# Patient Record
Sex: FEMALE | Race: WHITE | NOT HISPANIC OR LATINO | URBAN - METROPOLITAN AREA
[De-identification: names, ages, dates, MRNs, and addresses within clinical notes are randomized per-mention and may not be internally consistent; named-entity substitution may affect disease eponyms.]

---

## 2017-05-28 ENCOUNTER — EMERGENCY (EMERGENCY)
Facility: HOSPITAL | Age: 82
LOS: 1 days | Discharge: HOME | End: 2017-05-28
Attending: EMERGENCY MEDICINE | Admitting: EMERGENCY MEDICINE
Payer: MEDICARE

## 2017-05-28 VITALS
SYSTOLIC BLOOD PRESSURE: 182 MMHG | OXYGEN SATURATION: 99 % | DIASTOLIC BLOOD PRESSURE: 79 MMHG | RESPIRATION RATE: 16 BRPM | HEART RATE: 106 BPM

## 2017-05-28 VITALS
SYSTOLIC BLOOD PRESSURE: 188 MMHG | DIASTOLIC BLOOD PRESSURE: 76 MMHG | TEMPERATURE: 98 F | RESPIRATION RATE: 16 BRPM | OXYGEN SATURATION: 100 % | HEART RATE: 110 BPM

## 2017-05-28 DIAGNOSIS — S09.90XA UNSPECIFIED INJURY OF HEAD, INITIAL ENCOUNTER: ICD-10-CM

## 2017-05-28 DIAGNOSIS — Z23 ENCOUNTER FOR IMMUNIZATION: ICD-10-CM

## 2017-05-28 DIAGNOSIS — Z79.899 OTHER LONG TERM (CURRENT) DRUG THERAPY: ICD-10-CM

## 2017-05-28 DIAGNOSIS — Z79.82 LONG TERM (CURRENT) USE OF ASPIRIN: ICD-10-CM

## 2017-05-28 DIAGNOSIS — S80.01XA CONTUSION OF RIGHT KNEE, INITIAL ENCOUNTER: ICD-10-CM

## 2017-05-28 DIAGNOSIS — Z88.1 ALLERGY STATUS TO OTHER ANTIBIOTIC AGENTS STATUS: ICD-10-CM

## 2017-05-28 DIAGNOSIS — S01.81XA LACERATION WITHOUT FOREIGN BODY OF OTHER PART OF HEAD, INITIAL ENCOUNTER: ICD-10-CM

## 2017-05-28 DIAGNOSIS — Z88.8 ALLERGY STATUS TO OTHER DRUGS, MEDICAMENTS AND BIOLOGICAL SUBSTANCES STATUS: ICD-10-CM

## 2017-05-28 DIAGNOSIS — I10 ESSENTIAL (PRIMARY) HYPERTENSION: ICD-10-CM

## 2017-05-28 PROCEDURE — 99284 EMERGENCY DEPT VISIT MOD MDM: CPT

## 2017-05-28 PROCEDURE — 73564 X-RAY EXAM KNEE 4 OR MORE: CPT | Mod: 26,RT

## 2017-05-28 PROCEDURE — 72125 CT NECK SPINE W/O DYE: CPT | Mod: 26

## 2017-05-28 PROCEDURE — 70450 CT HEAD/BRAIN W/O DYE: CPT | Mod: 26

## 2017-05-28 RX ORDER — TETANUS TOXOID, REDUCED DIPHTHERIA TOXOID AND ACELLULAR PERTUSSIS VACCINE, ADSORBED 5; 2.5; 8; 8; 2.5 [IU]/.5ML; [IU]/.5ML; UG/.5ML; UG/.5ML; UG/.5ML
0.5 SUSPENSION INTRAMUSCULAR ONCE
Qty: 0 | Refills: 0 | Status: COMPLETED | OUTPATIENT
Start: 2017-05-28 | End: 2017-05-28

## 2017-05-28 RX ORDER — AMLODIPINE BESYLATE 2.5 MG/1
0 TABLET ORAL
Qty: 0 | Refills: 0 | COMMUNITY

## 2017-05-28 RX ORDER — HYDROCHLOROTHIAZIDE 25 MG
0 TABLET ORAL
Qty: 0 | Refills: 0 | COMMUNITY

## 2017-05-28 RX ORDER — ASPIRIN/CALCIUM CARB/MAGNESIUM 324 MG
0 TABLET ORAL
Qty: 0 | Refills: 0 | COMMUNITY

## 2017-05-28 RX ORDER — ACETAMINOPHEN 500 MG
650 TABLET ORAL ONCE
Qty: 0 | Refills: 0 | Status: COMPLETED | OUTPATIENT
Start: 2017-05-28 | End: 2017-05-28

## 2017-05-28 RX ADMIN — TETANUS TOXOID, REDUCED DIPHTHERIA TOXOID AND ACELLULAR PERTUSSIS VACCINE, ADSORBED 0.5 MILLILITER(S): 5; 2.5; 8; 8; 2.5 SUSPENSION INTRAMUSCULAR at 16:32

## 2017-05-28 RX ADMIN — Medication 650 MILLIGRAM(S): at 16:32

## 2017-05-28 NOTE — ED PROVIDER NOTE - PROGRESS NOTE DETAILS
Pt's hear rate 104.  Pt on pulse ox and seen with heart rate of 90's multiple times when in room with visitor.  She states she is anxious in the emergency department and adamantly requesting discharge. Pt's hear rate 104.  Pt on pulse ox and seen with heart rate of 90's, nsr multiple times when in room with visitor.  She states she is anxious in the emergency department and adamantly requesting discharge.

## 2017-05-28 NOTE — ED PROVIDER NOTE - CARE PLAN
Principal Discharge DX:	Facial laceration, initial encounter  Secondary Diagnosis:	Knee contusion  Secondary Diagnosis:	Closed head injury

## 2017-05-28 NOTE — ED ADULT TRIAGE NOTE - CHIEF COMPLAINT QUOTE
s/p trip and fall on stairs with forehead laceration s/p trip and fall on stairs with forehead laceration and right knee hematoma

## 2017-05-28 NOTE — ED PROVIDER NOTE - MEDICAL DECISION MAKING DETAILS
86 y/o F presents to ED s/p mechanical fall.  + forehead laceration repaired by plastics.  CT head, cervical spine negative for acute injury.  R knee wet read negative for acute fracture.  Pt to f/u with plastics.  Return precautions discussed. 86 y/o F presents to ED s/p mechanical fall.  + forehead laceration repaired by plastics.  CT head, cervical spine negative for acute injury.  R knee wet read negative for acute fracture.  Pt to f/u with plastics on Friday 6/2/17.  Return precautions discussed.

## 2017-05-28 NOTE — ED PROVIDER NOTE - OBJECTIVE STATEMENT
84 y/o F presents to ED c/o trip and fall while climbing stairs.  She fell forward and struck her forehead on a step.  No LOC.  No anticoagulants.  She c/o pain to forehead laceration and R neck pain.  She also notes she has swelling to R knee but denies any pain to the leg.  Denies headache, visual changes, n/v, abd pain, chest pain. 86 y/o F presents to ED c/o trip and fall while climbing stairs.  She fell forward and struck her forehead on a step.  No LOC.  No anticoagulants.  She c/o pain to forehead laceration and R neck pain.  She also notes she has swelling to R knee but denies any pain to the leg.  Denies headache, visual changes, n/v, abd pain, chest pain, hip pain.

## 2017-07-01 ENCOUNTER — OUTPATIENT (OUTPATIENT)
Dept: OUTPATIENT SERVICES | Facility: HOSPITAL | Age: 82
LOS: 1 days | Discharge: HOME | End: 2017-07-01

## 2017-07-01 DIAGNOSIS — R10.819 ABDOMINAL TENDERNESS, UNSPECIFIED SITE: ICD-10-CM

## 2018-01-11 ENCOUNTER — APPOINTMENT (OUTPATIENT)
Dept: HEART AND VASCULAR | Facility: CLINIC | Age: 83
End: 2018-01-11
Payer: MEDICARE

## 2018-01-11 ENCOUNTER — OUTPATIENT (OUTPATIENT)
Dept: OUTPATIENT SERVICES | Facility: HOSPITAL | Age: 83
LOS: 1 days | End: 2018-01-11
Payer: MEDICARE

## 2018-01-11 DIAGNOSIS — I10 ESSENTIAL (PRIMARY) HYPERTENSION: ICD-10-CM

## 2018-01-11 DIAGNOSIS — I49.1 ATRIAL PREMATURE DEPOLARIZATION: ICD-10-CM

## 2018-01-11 DIAGNOSIS — R07.9 CHEST PAIN, UNSPECIFIED: ICD-10-CM

## 2018-01-11 PROCEDURE — 93000 ELECTROCARDIOGRAM COMPLETE: CPT

## 2018-01-11 PROCEDURE — 93306 TTE W/DOPPLER COMPLETE: CPT | Mod: 26

## 2018-01-11 PROCEDURE — 99214 OFFICE O/P EST MOD 30 MIN: CPT | Mod: 25

## 2018-01-11 PROCEDURE — 93227 XTRNL ECG REC<48 HR R&I: CPT

## 2018-01-12 PROCEDURE — 93225 XTRNL ECG REC<48 HRS REC: CPT

## 2018-01-12 PROCEDURE — 93306 TTE W/DOPPLER COMPLETE: CPT

## 2018-01-26 PROBLEM — I10 BENIGN ESSENTIAL HYPERTENSION: Status: RESOLVED | Noted: 2018-01-26 | Resolved: 2018-01-26

## 2019-02-20 ENCOUNTER — TRANSCRIPTION ENCOUNTER (OUTPATIENT)
Age: 84
End: 2019-02-20

## 2019-02-25 ENCOUNTER — TRANSCRIPTION ENCOUNTER (OUTPATIENT)
Age: 84
End: 2019-02-25

## 2019-02-28 ENCOUNTER — OUTPATIENT (OUTPATIENT)
Dept: OUTPATIENT SERVICES | Facility: HOSPITAL | Age: 84
LOS: 1 days | Discharge: HOME | End: 2019-02-28

## 2019-02-28 DIAGNOSIS — R07.82 INTERCOSTAL PAIN: ICD-10-CM

## 2019-02-28 DIAGNOSIS — J18.9 PNEUMONIA, UNSPECIFIED ORGANISM: ICD-10-CM

## 2019-02-28 DIAGNOSIS — R05 COUGH: ICD-10-CM

## 2019-02-28 PROBLEM — I10 ESSENTIAL (PRIMARY) HYPERTENSION: Chronic | Status: ACTIVE | Noted: 2017-05-28

## 2019-06-21 ENCOUNTER — TRANSCRIPTION ENCOUNTER (OUTPATIENT)
Age: 84
End: 2019-06-21

## 2019-12-13 ENCOUNTER — OUTPATIENT (OUTPATIENT)
Dept: OUTPATIENT SERVICES | Facility: HOSPITAL | Age: 84
LOS: 1 days | Discharge: HOME | End: 2019-12-13

## 2019-12-13 VITALS — HEART RATE: 73 BPM | RESPIRATION RATE: 17 BRPM | DIASTOLIC BLOOD PRESSURE: 48 MMHG | SYSTOLIC BLOOD PRESSURE: 152 MMHG

## 2019-12-13 VITALS
WEIGHT: 136.03 LBS | DIASTOLIC BLOOD PRESSURE: 90 MMHG | HEIGHT: 60 IN | SYSTOLIC BLOOD PRESSURE: 195 MMHG | RESPIRATION RATE: 17 BRPM | OXYGEN SATURATION: 96 % | TEMPERATURE: 97 F | HEART RATE: 90 BPM

## 2019-12-13 DIAGNOSIS — Z96.1 PRESENCE OF INTRAOCULAR LENS: Chronic | ICD-10-CM

## 2019-12-13 DIAGNOSIS — Z98.890 OTHER SPECIFIED POSTPROCEDURAL STATES: Chronic | ICD-10-CM

## 2019-12-13 DIAGNOSIS — Z96.611 PRESENCE OF RIGHT ARTIFICIAL SHOULDER JOINT: Chronic | ICD-10-CM

## 2019-12-13 NOTE — ASU PATIENT PROFILE, ADULT - PSH
H/O colonoscopy    H/O total shoulder replacement, right    History of intraocular lens implant  right  Status post Mohs surgery  nose

## 2019-12-18 DIAGNOSIS — H25.89 OTHER AGE-RELATED CATARACT: ICD-10-CM

## 2019-12-18 DIAGNOSIS — I10 ESSENTIAL (PRIMARY) HYPERTENSION: ICD-10-CM

## 2019-12-18 DIAGNOSIS — Z96.1 PRESENCE OF INTRAOCULAR LENS: ICD-10-CM

## 2019-12-18 DIAGNOSIS — Z96.611 PRESENCE OF RIGHT ARTIFICIAL SHOULDER JOINT: ICD-10-CM

## 2019-12-18 DIAGNOSIS — I49.9 CARDIAC ARRHYTHMIA, UNSPECIFIED: ICD-10-CM

## 2019-12-18 DIAGNOSIS — Z88.0 ALLERGY STATUS TO PENICILLIN: ICD-10-CM

## 2019-12-18 DIAGNOSIS — Z88.2 ALLERGY STATUS TO SULFONAMIDES: ICD-10-CM

## 2021-07-06 ENCOUNTER — APPOINTMENT (OUTPATIENT)
Dept: CARDIOLOGY | Facility: CLINIC | Age: 86
End: 2021-07-06
Payer: MEDICARE

## 2021-07-06 VITALS — SYSTOLIC BLOOD PRESSURE: 160 MMHG | HEART RATE: 75 BPM | TEMPERATURE: 97 F | DIASTOLIC BLOOD PRESSURE: 70 MMHG

## 2021-07-06 PROCEDURE — 93000 ELECTROCARDIOGRAM COMPLETE: CPT

## 2021-07-06 PROCEDURE — 99204 OFFICE O/P NEW MOD 45 MIN: CPT

## 2021-07-06 RX ORDER — FAMOTIDINE 40 MG/1
40 TABLET, FILM COATED ORAL
Qty: 180 | Refills: 0 | Status: DISCONTINUED | COMMUNITY
Start: 2017-10-19 | End: 2021-07-06

## 2021-07-06 NOTE — REASON FOR VISIT
[Arrhythmia/ECG Abnorrmalities] : arrhythmia/ECG abnormalities [Hypertension] : hypertension [FreeTextEntry3] : Dr. Blood

## 2021-07-06 NOTE — CARDIOLOGY SUMMARY
[de-identified] : NSR pac NS T wave change.  [de-identified] : 2015 Adenosine Thallium No ischemia .  [de-identified] : 1987 No CAD

## 2021-07-06 NOTE — HISTORY OF PRESENT ILLNESS
[FreeTextEntry1] : The patient has long standing history of having work up for chest pan on multiple occasions including past cath - no CAD and negative nuclear stress test in 2018 . The patient has had palpitations  and when she takes her pulse she noted it to be regular. The patient has had HTN which was thought to have a white coat component to it

## 2021-07-06 NOTE — REVIEW OF SYSTEMS
[Weight Loss (___ Lbs)] : [unfilled] ~Ulb weight loss [Palpitations] : palpitations [Joint Pain] : joint pain [Anxiety] : anxiety [Negative] : Genitourinary [Fever] : no fever [Chills] : no chills [Feeling Fatigued] : not feeling fatigued [Joint Swelling] : no joint swelling

## 2021-07-06 NOTE — ASSESSMENT
[FreeTextEntry1] : The patient has HTN which may have a white coat component . The patent has had palpitations which appear to be from PAC's which has been documented in the past . Cholesterol is high but HDL is >90 .

## 2021-08-04 ENCOUNTER — APPOINTMENT (OUTPATIENT)
Dept: CARDIOLOGY | Facility: CLINIC | Age: 86
End: 2021-08-04
Payer: MEDICARE

## 2021-08-04 PROCEDURE — 93306 TTE W/DOPPLER COMPLETE: CPT

## 2021-08-19 ENCOUNTER — APPOINTMENT (OUTPATIENT)
Dept: CARDIOLOGY | Facility: CLINIC | Age: 86
End: 2021-08-19
Payer: MEDICARE

## 2021-08-19 VITALS
HEART RATE: 71 BPM | TEMPERATURE: 97.3 F | SYSTOLIC BLOOD PRESSURE: 140 MMHG | WEIGHT: 124 LBS | DIASTOLIC BLOOD PRESSURE: 70 MMHG | HEIGHT: 60 IN | BODY MASS INDEX: 24.35 KG/M2

## 2021-08-19 PROCEDURE — 93000 ELECTROCARDIOGRAM COMPLETE: CPT

## 2021-08-19 PROCEDURE — 99214 OFFICE O/P EST MOD 30 MIN: CPT

## 2021-08-19 NOTE — HISTORY OF PRESENT ILLNESS
[FreeTextEntry1] : The patient has long standing history of having work up for chest pan on multiple occasions including past cath - no CAD and negative nuclear stress test in 2018 .

## 2021-08-19 NOTE — CARDIOLOGY SUMMARY
[de-identified] : NSR pac NS T wave change.  [de-identified] : 2015 Adenosine Thallium No ischemia .  [de-identified] : 8-4-2021 Normal LV systolic functio Trace MR Mild MS RVSP was 45 mmhg  [de-identified] : 1987 No CAD

## 2021-08-19 NOTE — PHYSICAL EXAM
[Murmur] : murmur [Normal] : no edema, no cyanosis, no clubbing, no varicosities [de-identified] : [place]  [place]

## 2021-08-19 NOTE — ASSESSMENT
[FreeTextEntry1] : The patient has had palpitations which are most likley from PAC's / She had brought her BP home cuff in with her . BP on her cuff and mine was 180/80 . It subsequently went down to 160/80 . Her home BP 's were all low normal. No incidation to add medication at this time . Patient is not sure she was taking it appropriately however .

## 2021-08-27 ENCOUNTER — APPOINTMENT (OUTPATIENT)
Dept: CARDIOLOGY | Facility: CLINIC | Age: 86
End: 2021-08-27
Payer: MEDICARE

## 2021-08-27 PROCEDURE — 93244 EXT ECG>48HR<7D REV&INTERPJ: CPT

## 2021-12-13 ENCOUNTER — APPOINTMENT (OUTPATIENT)
Dept: CARDIOLOGY | Facility: CLINIC | Age: 86
End: 2021-12-13
Payer: MEDICARE

## 2021-12-13 VITALS
HEART RATE: 66 BPM | HEIGHT: 60 IN | BODY MASS INDEX: 24.74 KG/M2 | SYSTOLIC BLOOD PRESSURE: 172 MMHG | WEIGHT: 126 LBS | DIASTOLIC BLOOD PRESSURE: 70 MMHG

## 2021-12-13 PROCEDURE — 93000 ELECTROCARDIOGRAM COMPLETE: CPT

## 2021-12-13 PROCEDURE — 99214 OFFICE O/P EST MOD 30 MIN: CPT

## 2021-12-13 RX ORDER — ASPIRIN 81 MG
81 TABLET, DELAYED RELEASE (ENTERIC COATED) ORAL
Refills: 0 | Status: COMPLETED | COMMUNITY
End: 2021-12-13

## 2021-12-13 NOTE — CARDIOLOGY SUMMARY
[de-identified] : NSR pac NS T wave change.  [de-identified] : 2015 Adenosine Thallium No ischemia .  [de-identified] : 8-4-2021 Normal LV systolic functio Trace MR Mild MS miminmu AS . No LVH ., RVSP was 45 mmhg  [de-identified] : 1987 No CAD

## 2021-12-13 NOTE — PHYSICAL EXAM
[Murmur] : murmur [Normal] : no edema, no cyanosis, no clubbing, no varicosities [de-identified] : [place]  [place]

## 2021-12-13 NOTE — HISTORY OF PRESENT ILLNESS
[FreeTextEntry1] : The patient has not had chest pain . The patient had a 3 Day EPATCH which  shoed PAC one 6 beat run of PSVT .

## 2021-12-13 NOTE — ASSESSMENT
[FreeTextEntry1] : The patient has what is thought to be white coat HTN . Her readings on her home BP cuff were normal to low normal for her age. Her cuff was calibrated in the office as well and was functioning well. .  . The patient had a short run of PSVT 6 beats on holter montiro

## 2022-02-24 ENCOUNTER — APPOINTMENT (OUTPATIENT)
Dept: CARDIOLOGY | Facility: CLINIC | Age: 87
End: 2022-02-24

## 2022-05-16 ENCOUNTER — APPOINTMENT (OUTPATIENT)
Dept: CARDIOLOGY | Facility: CLINIC | Age: 87
End: 2022-05-16
Payer: MEDICARE

## 2022-05-16 VITALS — TEMPERATURE: 97.3 F | WEIGHT: 128 LBS | BODY MASS INDEX: 25.13 KG/M2 | HEIGHT: 60 IN

## 2022-05-16 VITALS — DIASTOLIC BLOOD PRESSURE: 80 MMHG | SYSTOLIC BLOOD PRESSURE: 134 MMHG

## 2022-05-16 VITALS — SYSTOLIC BLOOD PRESSURE: 156 MMHG | HEART RATE: 63 BPM | DIASTOLIC BLOOD PRESSURE: 68 MMHG

## 2022-05-16 PROCEDURE — 99214 OFFICE O/P EST MOD 30 MIN: CPT

## 2022-05-16 PROCEDURE — 93000 ELECTROCARDIOGRAM COMPLETE: CPT

## 2022-05-22 NOTE — CARDIOLOGY SUMMARY
[de-identified] : NSR pac NS T wave change. \par 5- NSR Normal ECG .  [de-identified] : 2015 Adenosine Thallium No ischemia .  [de-identified] : 8-4-2021 Normal LV systolic functio Trace MR Mild MS minimal AS . No LVH ., RVSP was 45 mmhg  [de-identified] : 1987 No CAD

## 2022-05-22 NOTE — HISTORY OF PRESENT ILLNESS
[FreeTextEntry1] : The patient has good control of her BP on home monitor and acceptable in the office . LDL is increased . HDL is >90 and she is in her 90's . Will not ad statins .

## 2022-05-22 NOTE — ASSESSMENT
[FreeTextEntry1] : The patient has what is thought to be white coat component to her HTN . Her readings on her home BP cuff were normal to low normal for her age. Her cuff was calibrated in the office as well and was functioning well. .She has had no palpitations and she has not had chest pan or SOB

## 2022-05-22 NOTE — PHYSICAL EXAM
[Murmur] : murmur [Normal] : no edema, no cyanosis, no clubbing, no varicosities [de-identified] : [place]  [place]

## 2022-06-20 ENCOUNTER — NON-APPOINTMENT (OUTPATIENT)
Age: 87
End: 2022-06-20

## 2022-11-21 ENCOUNTER — APPOINTMENT (OUTPATIENT)
Dept: CARDIOLOGY | Facility: CLINIC | Age: 87
End: 2022-11-21

## 2022-11-21 VITALS
HEART RATE: 78 BPM | SYSTOLIC BLOOD PRESSURE: 158 MMHG | HEIGHT: 60 IN | WEIGHT: 130 LBS | BODY MASS INDEX: 25.52 KG/M2 | TEMPERATURE: 97.3 F | DIASTOLIC BLOOD PRESSURE: 80 MMHG

## 2022-11-21 PROCEDURE — 93000 ELECTROCARDIOGRAM COMPLETE: CPT

## 2022-11-21 PROCEDURE — 99214 OFFICE O/P EST MOD 30 MIN: CPT

## 2022-11-21 RX ORDER — FLUOROURACIL 50 MG/G
5 CREAM TOPICAL
Qty: 40 | Refills: 0 | Status: DISCONTINUED | COMMUNITY
Start: 2022-03-09 | End: 2022-11-21

## 2022-11-21 RX ORDER — HYDROCORTISONE 25 MG/G
2.5 CREAM TOPICAL
Qty: 28 | Refills: 0 | Status: DISCONTINUED | COMMUNITY
Start: 2022-05-11 | End: 2022-11-21

## 2022-11-21 NOTE — ASSESSMENT
[FreeTextEntry1] : The patient has what is thought to be white coat component to her HTN . Her readings on her home BP cuff were normal to low normal for her age. Her cuff was calibrated in the office as well and was functioning well. .She has had no palpitations and she has not had chest pan or SOB . BP is stable when allowed to relax in the office . LDL is high er than ideal but she is 89 years old and HDL is >90

## 2022-11-21 NOTE — HISTORY OF PRESENT ILLNESS
[FreeTextEntry1] : The patient reports that her BP has been well controlled on home monitor . No chest pain . No SOB She remains active able to walk up to 3 miles per day .

## 2022-11-21 NOTE — CARDIOLOGY SUMMARY
[de-identified] : NSR pac NS T wave change. \par 5- NSR Normal ECG . \par 11- NSR Normal ECG  . [de-identified] : 2015 Adenosine Thallium No ischemia .  [de-identified] : 8-4-2021 Normal LV systolic functio Trace MR Mild MS minimal AS . No LVH ., RVSP was 45 mmhg  [de-identified] : 1987 No CAD

## 2022-11-21 NOTE — PHYSICAL EXAM
[Murmur] : murmur [Normal] : no edema, no cyanosis, no clubbing, no varicosities [de-identified] : [place]  [place]

## 2023-06-12 ENCOUNTER — APPOINTMENT (OUTPATIENT)
Dept: CARDIOLOGY | Facility: CLINIC | Age: 88
End: 2023-06-12
Payer: MEDICARE

## 2023-06-12 VITALS
SYSTOLIC BLOOD PRESSURE: 170 MMHG | DIASTOLIC BLOOD PRESSURE: 70 MMHG | BODY MASS INDEX: 25.91 KG/M2 | HEART RATE: 74 BPM | WEIGHT: 132 LBS | HEIGHT: 60 IN

## 2023-06-12 PROCEDURE — 99214 OFFICE O/P EST MOD 30 MIN: CPT

## 2023-06-12 PROCEDURE — 93000 ELECTROCARDIOGRAM COMPLETE: CPT

## 2023-06-12 NOTE — HISTORY OF PRESENT ILLNESS
[FreeTextEntry1] : The patient has been feeling well. She had Covid recently She had mostly congestion .

## 2023-06-12 NOTE — PHYSICAL EXAM
[Murmur] : murmur [Normal] : no edema, no cyanosis, no clubbing, no varicosities [de-identified] : [place]  [place]

## 2023-06-12 NOTE — CARDIOLOGY SUMMARY
[de-identified] : NSR pac NS T wave change. \par 5- NSR Normal ECG . \par 11- NSR Normal ECG  .\par 6- NSR PAC;s  [de-identified] : 2015 Adenosine Thallium No ischemia .  [de-identified] : 8-4-2021 Normal LV systolic functio Trace MR Mild MS minimal AS . No LVH ., RVSP was 45 mmhg  [de-identified] : 1987 No CAD

## 2023-06-12 NOTE — ASSESSMENT
[FreeTextEntry1] : The patient's BP has been better controlled at home compared tot he office . The patient has not had chest pain or SOB . The patient has MS and AS . She has some transmission to the neck . The patient has had no nuerological symptoms . TSH was high but Free T4 was normal . Will have patietn see Dr. Blood her PCP about that . BP is stable on home BP cuff /

## 2023-08-24 ENCOUNTER — NON-APPOINTMENT (OUTPATIENT)
Age: 88
End: 2023-08-24

## 2023-10-24 NOTE — ED PROVIDER NOTE - CPE EDP GASTRO NORM
Pt presents w/ no overt s/s of aspiration/penetration while consuming minced & moist and thin liquids. Pt refused further PO trials 2/2 globus sensation.
Mild/mod oral dysphagia with easy to chew solids, + tolerance for soft & bite sized with thin liquids without overt s/s of penetration/ aspiration.
normal...

## 2023-12-11 ENCOUNTER — APPOINTMENT (OUTPATIENT)
Dept: CARDIOLOGY | Facility: CLINIC | Age: 88
End: 2023-12-11

## 2024-02-13 ENCOUNTER — APPOINTMENT (OUTPATIENT)
Dept: CARDIOLOGY | Facility: CLINIC | Age: 89
End: 2024-02-13

## 2024-02-14 ENCOUNTER — APPOINTMENT (OUTPATIENT)
Dept: CARDIOLOGY | Facility: CLINIC | Age: 89
End: 2024-02-14
Payer: MEDICARE

## 2024-02-14 VITALS
SYSTOLIC BLOOD PRESSURE: 152 MMHG | HEART RATE: 78 BPM | WEIGHT: 130 LBS | DIASTOLIC BLOOD PRESSURE: 80 MMHG | BODY MASS INDEX: 25.52 KG/M2 | HEIGHT: 60 IN

## 2024-02-14 DIAGNOSIS — R00.2 PALPITATIONS: ICD-10-CM

## 2024-02-14 PROCEDURE — 93000 ELECTROCARDIOGRAM COMPLETE: CPT

## 2024-02-14 PROCEDURE — 93242 EXT ECG>48HR<7D RECORDING: CPT

## 2024-02-14 PROCEDURE — 93306 TTE W/DOPPLER COMPLETE: CPT

## 2024-02-14 PROCEDURE — 99214 OFFICE O/P EST MOD 30 MIN: CPT

## 2024-02-14 NOTE — CARDIOLOGY SUMMARY
[de-identified] : NSR pac NS T wave change.  5- NSR Normal ECG .  11- NSR Normal ECG  . 2- AF NS  Twave change .  6- NSR PAC;s  [de-identified] : 2015 Adenosine Thallium No ischemia .  [de-identified] : 8-4-2021 Normal LV systolic functio Trace MR Mild MS minimal AS . No LVH ., RVSP was 45 mmhg  [de-identified] : 1987 No CAD

## 2024-02-14 NOTE — ASSESSMENT
[FreeTextEntry1] : The patient has had no chest pain or SOB . Her BP is borderline in the office . Her home BP cuffs mataches BP in the office . She was noted to be in AF with controlled VR . This is new . She is CHADSvasc 4 which is high risk . Discussed options and I have told her that the best option for stroke prevention would be A/C but the bleeding risk would be increased .She does not have symptms of this. Preliminary Echo showed enloarged RA and LA and mild AS . with normal Lv sysotlic ufnction . She has not had bleeding except for mild epistaxis .  She is 89 years old and weighs less 60kg, will start Eliquis 2.5 mg BID .

## 2024-02-14 NOTE — HISTORY OF PRESENT ILLNESS
[FreeTextEntry1] : The patient has been feeling fatigued as she has not been sleeping well. Preliminary echo  result showed marked increased size of RA and LA . ECT today showed  AF with controlled VR . She has not had chest pain or Shortness of breat h

## 2024-02-18 PROBLEM — R00.2 INTERMITTENT PALPITATIONS: Status: ACTIVE | Noted: 2018-01-26

## 2024-02-26 ENCOUNTER — APPOINTMENT (OUTPATIENT)
Dept: CARDIOLOGY | Facility: CLINIC | Age: 89
End: 2024-02-26
Payer: MEDICARE

## 2024-02-26 PROCEDURE — 93244 EXT ECG>48HR<7D REV&INTERPJ: CPT

## 2024-03-21 ENCOUNTER — APPOINTMENT (OUTPATIENT)
Dept: CARDIOLOGY | Facility: CLINIC | Age: 89
End: 2024-03-21
Payer: MEDICARE

## 2024-03-21 VITALS
BODY MASS INDEX: 25.52 KG/M2 | WEIGHT: 130 LBS | SYSTOLIC BLOOD PRESSURE: 166 MMHG | HEIGHT: 60 IN | HEART RATE: 90 BPM | DIASTOLIC BLOOD PRESSURE: 80 MMHG

## 2024-03-21 VITALS — DIASTOLIC BLOOD PRESSURE: 80 MMHG | SYSTOLIC BLOOD PRESSURE: 150 MMHG | HEART RATE: 80 BPM

## 2024-03-21 PROCEDURE — 99214 OFFICE O/P EST MOD 30 MIN: CPT

## 2024-03-21 PROCEDURE — 93000 ELECTROCARDIOGRAM COMPLETE: CPT

## 2024-03-21 RX ORDER — LEVOTHYROXINE SODIUM 0.05 MG/1
50 TABLET ORAL
Refills: 0 | Status: COMPLETED | COMMUNITY
End: 2024-03-21

## 2024-03-21 NOTE — PHYSICAL EXAM
[Murmur] : murmur [Normal] : soft, non-tender, no masses/organomegaly, normal bowel sounds [de-identified] : S1 and S2 are irregular .  [de-identified] : I-IIVI  [place]

## 2024-03-21 NOTE — HISTORY OF PRESENT ILLNESS
[FreeTextEntry1] : The patient feels well overall. She does not feel the difference if she is n AF or not  She has an enlarged LA Volume index of 54 . She also has had labile thyroid function ranging from hyper to hypothyroid .  She has had some edema .

## 2024-03-21 NOTE — REVIEW OF SYSTEMS
[Fever] : no fever [Chills] : no chills [Feeling Fatigued] : not feeling fatigued [Weight Loss (___ Lbs)] : [unfilled] ~Ulb weight loss [Joint Pain] : joint pain [Palpitations] : palpitations [Joint Swelling] : no joint swelling [Anxiety] : anxiety [Negative] : Genitourinary

## 2024-03-21 NOTE — CARDIOLOGY SUMMARY
[de-identified] : NSR pac NS T wave change.  5- NSR Normal ECG .  11- NSR Normal ECG  . 11 2- AF NS  Twave change .  6- NSR PAC;s  3- AF NS  Twave change  [de-identified] : 2015 Adenosine Thallium No ischemia .  [de-identified] : 8-4-2021 Normal LV systolic functio Trace MR Mild MS minimal AS . No LVH ., RVSP was 45 mmhg  [de-identified] : 1987 No CAD

## 2024-04-25 RX ORDER — APIXABAN 2.5 MG/1
2.5 TABLET, FILM COATED ORAL
Qty: 180 | Refills: 3 | Status: ACTIVE | COMMUNITY
Start: 2024-02-14 | End: 1900-01-01

## 2024-04-25 RX ORDER — HYDROCHLOROTHIAZIDE 25 MG/1
25 TABLET ORAL DAILY
Qty: 90 | Refills: 3 | Status: ACTIVE | COMMUNITY
Start: 2017-12-13 | End: 1900-01-01

## 2024-04-25 RX ORDER — AMLODIPINE BESYLATE 5 MG/1
5 TABLET ORAL
Qty: 90 | Refills: 3 | Status: ACTIVE | COMMUNITY
Start: 2017-03-17 | End: 1900-01-01

## 2024-07-01 ENCOUNTER — APPOINTMENT (OUTPATIENT)
Dept: CARDIOLOGY | Facility: CLINIC | Age: 89
End: 2024-07-01
Payer: MEDICARE

## 2024-07-01 VITALS — DIASTOLIC BLOOD PRESSURE: 70 MMHG | SYSTOLIC BLOOD PRESSURE: 160 MMHG | HEART RATE: 89 BPM

## 2024-07-01 VITALS — WEIGHT: 131 LBS | HEIGHT: 62 IN | BODY MASS INDEX: 24.11 KG/M2

## 2024-07-01 DIAGNOSIS — I48.91 UNSPECIFIED ATRIAL FIBRILLATION: ICD-10-CM

## 2024-07-01 DIAGNOSIS — I48.19 OTHER PERSISTENT ATRIAL FIBRILLATION: ICD-10-CM

## 2024-07-01 PROBLEM — E78.5 HYPERLIPEMIA: Status: ACTIVE | Noted: 2021-07-06

## 2024-07-01 PROBLEM — I10 HYPERTENSION: Status: ACTIVE | Noted: 2021-07-06

## 2024-07-01 PROCEDURE — 93000 ELECTROCARDIOGRAM COMPLETE: CPT

## 2024-07-01 PROCEDURE — 99214 OFFICE O/P EST MOD 30 MIN: CPT

## 2024-07-18 ENCOUNTER — APPOINTMENT (OUTPATIENT)
Dept: CARDIOLOGY | Facility: CLINIC | Age: 89
End: 2024-07-18
Payer: MEDICARE

## 2024-07-18 DIAGNOSIS — R00.2 PALPITATIONS: ICD-10-CM

## 2024-07-18 DIAGNOSIS — I10 ESSENTIAL (PRIMARY) HYPERTENSION: ICD-10-CM

## 2024-07-18 DIAGNOSIS — E78.5 HYPERLIPIDEMIA, UNSPECIFIED: ICD-10-CM

## 2024-07-18 PROCEDURE — 93228 REMOTE 30 DAY ECG REV/REPORT: CPT

## 2024-08-14 ENCOUNTER — APPOINTMENT (OUTPATIENT)
Dept: CARDIOLOGY | Facility: CLINIC | Age: 89
End: 2024-08-14

## 2024-09-28 ENCOUNTER — NON-APPOINTMENT (OUTPATIENT)
Age: 89
End: 2024-09-28

## 2024-12-04 ENCOUNTER — APPOINTMENT (OUTPATIENT)
Dept: CARDIOLOGY | Facility: CLINIC | Age: 88
End: 2024-12-04
Payer: MEDICARE

## 2024-12-04 VITALS — WEIGHT: 132 LBS | BODY MASS INDEX: 24.29 KG/M2 | HEIGHT: 62 IN

## 2024-12-04 VITALS — DIASTOLIC BLOOD PRESSURE: 70 MMHG | HEART RATE: 87 BPM | SYSTOLIC BLOOD PRESSURE: 162 MMHG

## 2024-12-04 VITALS — SYSTOLIC BLOOD PRESSURE: 150 MMHG | DIASTOLIC BLOOD PRESSURE: 80 MMHG

## 2024-12-04 DIAGNOSIS — I48.91 UNSPECIFIED ATRIAL FIBRILLATION: ICD-10-CM

## 2024-12-04 DIAGNOSIS — E78.5 HYPERLIPIDEMIA, UNSPECIFIED: ICD-10-CM

## 2024-12-04 DIAGNOSIS — I10 ESSENTIAL (PRIMARY) HYPERTENSION: ICD-10-CM

## 2024-12-04 PROCEDURE — 93000 ELECTROCARDIOGRAM COMPLETE: CPT

## 2024-12-04 PROCEDURE — 99214 OFFICE O/P EST MOD 30 MIN: CPT

## 2025-01-28 NOTE — ED PROVIDER NOTE - HEAD, MLM
[FreeTextEntry1] : 59-year-old female with crystalluria and microscopic hematuria. s/p negative hematuria workup 11/2024. Patient is without UTI symptoms. Will repeat urine studies. We discussed crystalluria does predict the development of macroscopic stone disease. For now, we discussed dietary medications for stone prevention. Patient was instructed to increase fluid intake to a urine output of 2L daily, reduce oxalate foods, and to reduce sodium intake.  Patient will RTO in 1 year or sooner if needed.  Head is atraumatic. Head shape is symmetrical.

## 2025-03-23 ENCOUNTER — EMERGENCY (EMERGENCY)
Facility: HOSPITAL | Age: 89
LOS: 0 days | Discharge: HOME | End: 2025-03-24
Attending: EMERGENCY MEDICINE
Payer: MEDICARE

## 2025-03-23 VITALS
OXYGEN SATURATION: 99 % | DIASTOLIC BLOOD PRESSURE: 84 MMHG | RESPIRATION RATE: 19 BRPM | HEART RATE: 93 BPM | TEMPERATURE: 98 F | WEIGHT: 134.04 LBS | SYSTOLIC BLOOD PRESSURE: 177 MMHG
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
## 2025-03-23 DIAGNOSIS — S01.81XA LACERATION WITHOUT FOREIGN BODY OF OTHER PART OF HEAD, INITIAL ENCOUNTER: ICD-10-CM

## 2025-03-23 DIAGNOSIS — I48.91 UNSPECIFIED ATRIAL FIBRILLATION: ICD-10-CM

## 2025-03-23 DIAGNOSIS — Z96.611 PRESENCE OF RIGHT ARTIFICIAL SHOULDER JOINT: Chronic | ICD-10-CM

## 2025-03-23 DIAGNOSIS — Z88.2 ALLERGY STATUS TO SULFONAMIDES: ICD-10-CM
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
## 2025-03-23 DIAGNOSIS — Z79.01 LONG TERM (CURRENT) USE OF ANTICOAGULANTS: ICD-10-CM

## 2025-03-23 DIAGNOSIS — Z23 ENCOUNTER FOR IMMUNIZATION: ICD-10-CM

## 2025-03-23 DIAGNOSIS — Z88.0 ALLERGY STATUS TO PENICILLIN: ICD-10-CM

## 2025-03-23 DIAGNOSIS — Z96.1 PRESENCE OF INTRAOCULAR LENS: Chronic | ICD-10-CM

## 2025-03-23 DIAGNOSIS — I10 ESSENTIAL (PRIMARY) HYPERTENSION: ICD-10-CM

## 2025-03-23 DIAGNOSIS — Z98.890 OTHER SPECIFIED POSTPROCEDURAL STATES: Chronic | ICD-10-CM
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
## 2025-03-23 DIAGNOSIS — Z04.3 ENCOUNTER FOR EXAMINATION AND OBSERVATION FOLLOWING OTHER ACCIDENT: ICD-10-CM

## 2025-03-23 PROCEDURE — 99285 EMERGENCY DEPT VISIT HI MDM: CPT | Mod: 25

## 2025-03-23 PROCEDURE — 90715 TDAP VACCINE 7 YRS/> IM: CPT

## 2025-03-23 PROCEDURE — 72125 CT NECK SPINE W/O DYE: CPT | Mod: MC

## 2025-03-23 PROCEDURE — 70450 CT HEAD/BRAIN W/O DYE: CPT | Mod: 26

## 2025-03-23 PROCEDURE — 99292 CRITICAL CARE ADDL 30 MIN: CPT

## 2025-03-23 PROCEDURE — 71045 X-RAY EXAM CHEST 1 VIEW: CPT

## 2025-03-23 PROCEDURE — 70486 CT MAXILLOFACIAL W/O DYE: CPT | Mod: 26

## 2025-03-23 PROCEDURE — 71045 X-RAY EXAM CHEST 1 VIEW: CPT | Mod: 26

## 2025-03-23 PROCEDURE — 76705 ECHO EXAM OF ABDOMEN: CPT | Mod: 26

## 2025-03-23 PROCEDURE — 72125 CT NECK SPINE W/O DYE: CPT | Mod: 26

## 2025-03-23 PROCEDURE — 82962 GLUCOSE BLOOD TEST: CPT

## 2025-03-23 PROCEDURE — 99291 CRITICAL CARE FIRST HOUR: CPT | Mod: 25

## 2025-03-23 PROCEDURE — 70450 CT HEAD/BRAIN W/O DYE: CPT | Mod: MC

## 2025-03-23 PROCEDURE — 76705 ECHO EXAM OF ABDOMEN: CPT

## 2025-03-23 PROCEDURE — 99285 EMERGENCY DEPT VISIT HI MDM: CPT

## 2025-03-23 PROCEDURE — 90471 IMMUNIZATION ADMIN: CPT

## 2025-03-23 PROCEDURE — 70486 CT MAXILLOFACIAL W/O DYE: CPT | Mod: MC

## 2025-03-23 PROCEDURE — 12011 RPR F/E/E/N/L/M 2.5 CM/<: CPT

## 2025-03-23 RX ORDER — CLOSTRIDIUM TETANI TOXOID ANTIGEN (FORMALDEHYDE INACTIVATED), CORYNEBACTERIUM DIPHTHERIAE TOXOID ANTIGEN (FORMALDEHYDE INACTIVATED), BORDETELLA PERTUSSIS TOXOID ANTIGEN (GLUTARALDEHYDE INACTIVATED), BORDETELLA PERTUSSIS FILAMENTOUS HEMAGGLUTININ ANTIGEN (FORMALDEHYDE INACTIVATED), BORDETELLA PERTUSSIS PERTACTIN ANTIGEN, AND BORDETELLA PERTUSSIS FIMBRIAE 2/3 ANTIGEN 5; 2; 2.5; 5; 3; 5 [LF]/.5ML; [LF]/.5ML; UG/.5ML; UG/.5ML; UG/.5ML; UG/.5ML
0.5 INJECTION, SUSPENSION INTRAMUSCULAR ONCE
Refills: 0 | Status: COMPLETED | OUTPATIENT
Start: 2025-03-23 | End: 2025-03-23

## 2025-03-23 NOTE — ED PROVIDER NOTE - ATTENDING CONTRIBUTION TO CARE
I personally evaluated patient. I agree with the findings and plan with all documentation on chart except as documented  in my note.    89-year-old female past medical history of hypertension, A-fib on Eliquis who presents to the emergency department after head injury.  Patient was walking when she was trying to avoid grapes on the floor and hit her left forehead on a glass door.  Patient did not fall to the ground or lose consciousness.  Patient had mild bleeding above her left eyebrow and bruising but denies any other pain or injuries.  Patient has no neck pain, chest pain, rib pain, abdominal pain, back pain, arm or leg pain.  Patient brought to the ED was a trauma alert based on head injury on Eliquis.  Vital signs reviewed and GCS 15.  Patient looks a lot younger than stated age and has mild bruising and hematoma to forehead with small laceration.  No active bleeding.  Remainder secondary survey negative for any serious injury.  Patient had appropriate imaging that did not show any acute fractures.  FAST exam and chest x-ray performed.  Patient given tetanus shot and laceration repaired as described.  Will discharge with appropriate home care and follow-up, strict return precautions.    Full DC instructions discussed and patient knows when to seek immediate medical attention.  Patient has proper follow up.  All results discussed and patient aware they may require further work up.  Proper follow up ensured. Limitations of ED work up discussed.  Medications administered and prescribed/OTC home meds discussed.  All questions and concerns from patient or family addressed. Understanding of instructions verbalized.

## 2025-03-23 NOTE — ED PROVIDER NOTE - NSFOLLOWUPINSTRUCTIONS_ED_ALL_ED_FT
Closed Head Injury    Her CT scan was negative and she did not have any bleed or fractures. Please return in 5-7 days for wound check and suture removal.    A closed head injury is an injury to your head that may or may not involve a traumatic brain injury (TBI). Symptoms of TBI can be short or long lasting and include headache, dizziness, interference with memory or speech, fatigue, confusion, changes in sleep, mood changes, nausea, depression/anxiety, and dulling of senses. Make sure to obtain proper rest which includes getting plenty of sleep, avoiding excessive visual stimulation, and avoiding activities that may cause physical or mental stress. Avoid any situation where there is potential for another head injury, including sports.    SEEK IMMEDIATE MEDICAL CARE IF YOU HAVE ANY OF THE FOLLOWING SYMPTOMS: unusual drowsiness, vomiting, severe dizziness, seizures, lightheadedness, muscular weakness, different pupil sizes, visual changes, or clear or bloody discharge from your ears or nose.      Laceration    A laceration is a cut that goes through all of the layers of the skin and into the tissue that is right under the skin. Some lacerations heal on their own. Others need to be closed with skin adhesive strips, skin glue, stitches (sutures), or staples. Proper laceration care minimizes the risk of infection and helps the laceration to heal better.  If non-absorbable stitches or staples have been placed, they must be taken out within the time frame instructed by your healthcare provider.    SEEK IMMEDIATE MEDICAL CARE IF YOU HAVE ANY OF THE FOLLOWING SYMPTOMS: swelling around the wound, worsening pain, drainage from the wound, red streaking going away from your wound, inability to move finger or toe near the laceration, or discoloration of skin near the laceration.

## 2025-03-23 NOTE — ED PROVIDER NOTE - PROGRESS NOTE DETAILS
JV: Laceration repair done. 3 stitches placed in left forehead. Patient let known to come back to the ED for suture removal in 3-5 days. JV: Results of CT scans gone over with patient and tetanus shot given to patient

## 2025-03-23 NOTE — ED PROVIDER NOTE - PHYSICAL EXAMINATION
VITAL SIGNS: I have reviewed nursing notes and confirm.  CONSTITUTIONAL: well-appearing, non-toxic, NAD  SKIN: 0.3 cm lac to L side of forehead, no active bleeding  HEAD: NCAT  NECK: Supple  CARD: RRR  RESP: clear to ausculation b/l.   ABD: soft, + BS, non-tender, non-distended, no rebound or guarding.   EXT: Full ROM, no bony tenderness  NEURO: normal motor. normal sensory.   PSYCH: Cooperative, appropriate.

## 2025-03-23 NOTE — CONSULT NOTE ADULT - ASSESSMENT
ASSESSMENT:  89yF w/ PMHx of Hypertension, mohs surgery, right total shoulder replacement, afib on eliquis seen as Trauma Alert s/p mechanical fall, hitting her face on a glass door (+) HT, (-) LOC, (+) AC. Patient states she was walking in the kitchen and trying to avoid a grape on the floor when she slipped and fell sideways to the left, hitting her face on a glass door, not breaking the glass. She denies any nausea, vomiting, shortness of breath, dizziness, blurry vision, recent illness, chest pain or urinary symptoms. She is currently complaining of mild left sided face pain.     External signs of trauma include:   - closed laceration approx 2cm in length superior to left eyebrow  -  Mild swelling surrounding nasal bridge b/l    Trauma assessment in ED: ABCs intact , GCS 15 , AAOx3,  BARBOUR.     Injuries identified:   -   -   -     PLAN:   - Trauma Labs: (CBC, BMP, Coags, T&S, UA, EtOH level)  Additional studies:  EKG  Utox    Trauma Imaging to include the following:  - CXR, Pelvic Xray  - CT Head,  CT C-spine, CT Max/Face  - Extremity films: None    Additional consultations:  -     Disposition pending results of above labs and imaging  Above plan discussed with Trauma attending, Dr. Chatman, patient, patient family, and ED team  --------------------------------------------------------------------------------------  03-23-25 @ 22:35 ASSESSMENT:  89yF w/ PMHx of Hypertension, mohs surgery, right total shoulder replacement, afib on eliquis seen as Trauma Alert s/p mechanical fall, hitting her face on a glass door (+) HT, (-) LOC, (+) AC. Patient states she was walking in the kitchen and trying to avoid a grape on the floor when she slipped and fell sideways to the left, hitting her face on a glass door, not breaking the glass. She denies any nausea, vomiting, shortness of breath, dizziness, blurry vision, recent illness, chest pain or urinary symptoms. She is currently complaining of mild left sided face pain.     External signs of trauma include:   - closed laceration approx 2cm in length superior to left eyebrow  -  Mild swelling surrounding nasal bridge b/l    Trauma assessment in ED: ABCs intact , GCS 15 , AAOx3,  BARBOUR.     Injuries identified:   - no acute injuries on imaging    PLAN:   - No acute traumatic injuries identified on imaging workup, patient refused pelvic XR  - Patient refusing trauma lab workup  - Patient should return to the ED for further evaluation if she begins exhibiting concerning symptoms including nausea, vomiting, dizziness, blurry vision, new or worsening pain, chest pain, shortness of breath etc.  - No further trauma surgery intervention at this time   - Dispo per ED    Above plan discussed with Trauma attending, Dr. Chatman, patient, patient family, and ED team  --------------------------------------------------------------------------------------  03-23-25 @ 22:35

## 2025-03-23 NOTE — ED ADULT TRIAGE NOTE - CHIEF COMPLAINT QUOTE
Pt presenting to ED s/p fall. +HT laceration to L side of forehead, +AC (eliquis), -LOC. Trauma alert activated

## 2025-03-23 NOTE — ED PROVIDER NOTE - OBJECTIVE STATEMENT
89yoF w. PMH of HTN, a. fib on elequis, presents to ED due to mechanical fall. Pt. slipped on grapes and injured L side of forehead with a glass door. Did not fall, no trauma to chest, abd/pelvis, or other injuries. Ambulatory after event. No LOC, no n/v, dizziness, blurred vision. Unknown last tetanus shot.

## 2025-03-23 NOTE — ED PROVIDER NOTE - DIFFERENTIAL DIAGNOSIS
Differential Diagnosis The differential diagnosis for patients clinical presentation includes but is not limited to:  sprain, strain, fracture, contusion, dislocation  head injury, SDH

## 2025-03-23 NOTE — CONSULT NOTE ADULT - SUBJECTIVE AND OBJECTIVE BOX
TRAUMA ACTIVATION LEVEL:  CODE / ALERT  / CONSULT  ACTIVATED BY: EMS**  /  ED**  INTUBATED: YES** / NO**      MECHANISM OF INJURY:   [] Blunt     [] MVC	  [x] Fall	  [] Pedestrian Struck	  [] Motorcycle     [] Assault     [] Bicycle collision    [] Sports injury    [] Penetrating    [] Gun Shot Wound      [] Stab Wound    GCS: 15 	E: 4	V: 5	M: 6    HPI: 89yF w/ PMHx of Hypertension, mohs surgery, right total shoulder replacement, afib on eliquis seen as Trauma Alert s/p mechanical fall, hitting her face on a glass door (+) HT, (-) LOC, (+) AC. Patient states she was walking in the kitchen and trying to avoid a grape on the floor when she slipped and fell sideways to the left, hitting her face on a glass door, not breaking the glass. She denies any nausea, vomiting, shortness of breath, dizziness, blurry vision, recent illness, chest pain or urinary symptoms. She is currently complaining of mild left sided face pain.       Trauma assessment in ED: ABCs intact , GCS 15 , AAOx3.    PAST MEDICAL & SURGICAL HISTORY:  HTN (hypertension)  H/O colonoscopy  History of intraocular lens implant  right  Status post Mohs surgery  nose  H/O total shoulder replacement, right    Allergies    penicillin (Unknown)  sulfa drugs (Unknown)    Intolerances    Home Medications:  amLODIPine:  orally  (28 May 2017 15:32)  aspirin:  orally  (28 May 2017 15:32)  hydroCHLOROthiazide:  orally  (28 May 2017 15:32)      ROS: 10-system review is otherwise negative except HPI above.      Primary Survey:    A - airway intact  B - bilateral breath sounds and good chest rise  C - palpable pulses in all extremities  D - GCS 15 on arrival, BARBOUR  Exposure obtained    Vital Signs Last 24 Hrs  T(C): 36.7 (23 Mar 2025 20:23), Max: 36.7 (23 Mar 2025 20:23)  T(F): 98.1 (23 Mar 2025 20:23), Max: 98.1 (23 Mar 2025 20:23)  HR: 93 (23 Mar 2025 20:23) (93 - 93)  BP: 177/84 (23 Mar 2025 20:23) (177/84 - 177/84)  BP(mean): --  RR: 19 (23 Mar 2025 20:23) (19 - 19)  SpO2: 99% (23 Mar 2025 20:23) (99% - 99%)    Parameters below as of 23 Mar 2025 20:23  Patient On (Oxygen Delivery Method): room air    Secondary Survey:   General: NAD  HEENT: Normocephalic, small, closed laceration above left eyebrow, mild swelling surrounding nasal bridge bilaterally, EOMI, PEERLA. no scalp lacerations   Neck: Soft, midline trachea. no c-spine tenderness  Chest: No chest wall tenderness, no subcutaneous emphysema   Cardiac: S1, S2, RRR  Respiratory: Bilateral breath sounds, clear and equal bilaterally  Abdomen: Soft, non-distended, non-tender, no rebound, no guarding.  Groin: Normal appearing, pelvis stable   Ext:  Moving b/l upper and lower extremities. Palpable Radial b/l UE, b/l DP palpable in LE.   Back: No T/L/S spine tenderness, No palpable runoff/stepoff/deformity    FAST: Negative    ACCESS / DEVICES:  [x] Peripheral IV  [ ] Central Venous Line	[ ] R	[ ] L	[ ] IJ	[ ] Fem	[ ] SC	Placed:   [ ] Arterial Line		[ ] R	[ ] L	[ ] Fem	[ ] Rad	[ ] Ax	Placed:   [ ] PICC:					[ ] Mediport  [ ] Urinary Catheter,  Date Placed:   [ ] Chest tube: [ ] Right, [ ] Left  [ ] LUNA/Garcia Drains    Labs:  CAPILLARY BLOOD GLUCOSE    POCT Blood Glucose.: 124 mg/dL (23 Mar 2025 20:36)    LFTs:    Coags:    RADIOLOGY & ADDITIONAL STUDIES:  ---------------------------------------------------------------------------------------

## 2025-03-23 NOTE — ED PROVIDER NOTE - PATIENT PORTAL LINK FT
You can access the FollowMyHealth Patient Portal offered by [facility] by registering at the following website: [email or web address]. By joining [employer] [employer]’s FollowMyHealth portal, you will also be able to view your health information using other applications (apps) compatible with our system.

## 2025-03-23 NOTE — ED PROVIDER NOTE - CLINICAL SUMMARY MEDICAL DECISION MAKING FREE TEXT BOX
89-year-old female past medical history of hypertension, A-fib on Eliquis who presents to the emergency department after head injury.  Patient was walking when she was trying to avoid grapes on the floor and hit her left forehead on a glass door.  Patient did not fall to the ground or lose consciousness.  Patient had mild bleeding above her left eyebrow and bruising but denies any other pain or injuries.  Patient has no neck pain, chest pain, rib pain, abdominal pain, back pain, arm or leg pain.  Patient brought to the ED was a trauma alert based on head injury on Eliquis.  Vital signs reviewed and GCS 15.  Patient looks a lot younger than stated age and has mild bruising and hematoma to forehead with small laceration.  No active bleeding.  Remainder secondary survey negative for any serious injury.  Patient had appropriate imaging that did not show any acute fractures.  FAST exam and chest x-ray performed.  Patient given tetanus shot and laceration repaired as described.  Will discharge with appropriate home care and follow-up, strict return precautions.    Full DC instructions discussed and patient knows when to seek immediate medical attention.  Patient has proper follow up.  All results discussed and patient aware they may require further work up.  Proper follow up ensured. Limitations of ED work up discussed.  Medications administered and prescribed/OTC home meds discussed.  All questions and concerns from patient or family addressed. Understanding of instructions verbalized.

## 2025-03-23 NOTE — ED PROVIDER NOTE - CARE PLAN
Principal Discharge DX:	Fall   1 Principal Discharge DX:	Fall  Secondary Diagnosis:	Forehead laceration  Secondary Diagnosis:	Forehead laceration

## 2025-03-24 RX ADMIN — CLOSTRIDIUM TETANI TOXOID ANTIGEN (FORMALDEHYDE INACTIVATED), CORYNEBACTERIUM DIPHTHERIAE TOXOID ANTIGEN (FORMALDEHYDE INACTIVATED), BORDETELLA PERTUSSIS TOXOID ANTIGEN (GLUTARALDEHYDE INACTIVATED), BORDETELLA PERTUSSIS FILAMENTOUS HEMAGGLUTININ ANTIGEN (FORMALDEHYDE INACTIVATED), BORDETELLA PERTUSSIS PERTACTIN ANTIGEN, AND BORDETELLA PERTUSSIS FIMBRIAE 2/3 ANTIGEN 0.5 MILLILITER(S): 5; 2; 2.5; 5; 3; 5 INJECTION, SUSPENSION INTRAMUSCULAR at 00:54

## 2025-04-01 ENCOUNTER — RX RENEWAL (OUTPATIENT)
Age: 89
End: 2025-04-01

## 2025-04-08 ENCOUNTER — NON-APPOINTMENT (OUTPATIENT)
Age: 89
End: 2025-04-08

## 2025-04-10 ENCOUNTER — APPOINTMENT (OUTPATIENT)
Dept: CARDIOLOGY | Facility: CLINIC | Age: 89
End: 2025-04-10
Payer: MEDICARE

## 2025-04-10 VITALS — HEART RATE: 79 BPM | DIASTOLIC BLOOD PRESSURE: 68 MMHG | SYSTOLIC BLOOD PRESSURE: 138 MMHG

## 2025-04-10 VITALS — BODY MASS INDEX: 24.11 KG/M2 | HEIGHT: 62 IN | WEIGHT: 131 LBS | TEMPERATURE: 97.6 F

## 2025-04-10 DIAGNOSIS — R00.2 PALPITATIONS: ICD-10-CM

## 2025-04-10 DIAGNOSIS — I35.0 NONRHEUMATIC AORTIC (VALVE) STENOSIS: ICD-10-CM

## 2025-04-10 DIAGNOSIS — E78.5 HYPERLIPIDEMIA, UNSPECIFIED: ICD-10-CM

## 2025-04-10 DIAGNOSIS — I48.91 UNSPECIFIED ATRIAL FIBRILLATION: ICD-10-CM

## 2025-04-10 PROCEDURE — 93306 TTE W/DOPPLER COMPLETE: CPT

## 2025-04-10 PROCEDURE — 99214 OFFICE O/P EST MOD 30 MIN: CPT

## 2025-04-10 PROCEDURE — 93000 ELECTROCARDIOGRAM COMPLETE: CPT

## 2025-06-27 ENCOUNTER — RX RENEWAL (OUTPATIENT)
Age: 89
End: 2025-06-27

## 2025-07-19 ENCOUNTER — NON-APPOINTMENT (OUTPATIENT)
Age: 89
End: 2025-07-19